# Patient Record
Sex: FEMALE | Race: BLACK OR AFRICAN AMERICAN | NOT HISPANIC OR LATINO | URBAN - METROPOLITAN AREA
[De-identification: names, ages, dates, MRNs, and addresses within clinical notes are randomized per-mention and may not be internally consistent; named-entity substitution may affect disease eponyms.]

---

## 2024-03-02 ENCOUNTER — APPOINTMENT (OUTPATIENT)
Dept: RADIOLOGY | Facility: HOSPITAL | Age: 59
End: 2024-03-02

## 2024-03-02 ENCOUNTER — CLINICAL SUPPORT (OUTPATIENT)
Dept: EMERGENCY MEDICINE | Facility: HOSPITAL | Age: 59
End: 2024-03-02

## 2024-03-02 ENCOUNTER — HOSPITAL ENCOUNTER (EMERGENCY)
Facility: HOSPITAL | Age: 59
Discharge: HOME | End: 2024-03-03
Attending: EMERGENCY MEDICINE

## 2024-03-02 DIAGNOSIS — S22.42XA CLOSED FRACTURE OF MULTIPLE RIBS OF LEFT SIDE, INITIAL ENCOUNTER: Primary | ICD-10-CM

## 2024-03-02 LAB
ANION GAP BLDV CALCULATED.4IONS-SCNC: 9 MMOL/L (ref 10–25)
ATRIAL RATE: 98 BPM
BASE EXCESS BLDV CALC-SCNC: 2.7 MMOL/L (ref -2–3)
BASOPHILS # BLD AUTO: 0.03 X10*3/UL (ref 0–0.1)
BASOPHILS NFR BLD AUTO: 0.3 %
BODY TEMPERATURE: 37 DEGREES CELSIUS
CA-I BLDV-SCNC: 1.12 MMOL/L (ref 1.1–1.33)
CARDIAC TROPONIN I PNL SERPL HS: 16 NG/L (ref 0–34)
CHLORIDE BLDV-SCNC: 104 MMOL/L (ref 98–107)
D DIMER PPP FEU-MCNC: 486 NG/ML FEU
EOSINOPHIL # BLD AUTO: 0.14 X10*3/UL (ref 0–0.7)
EOSINOPHIL NFR BLD AUTO: 1.5 %
ERYTHROCYTE [DISTWIDTH] IN BLOOD BY AUTOMATED COUNT: 11.5 % (ref 11.5–14.5)
GLUCOSE BLD MANUAL STRIP-MCNC: 310 MG/DL (ref 74–99)
GLUCOSE BLDV-MCNC: 353 MG/DL (ref 74–99)
HCO3 BLDV-SCNC: 27.9 MMOL/L (ref 22–26)
HCT VFR BLD AUTO: 37.6 % (ref 36–46)
HCT VFR BLD EST: 41 % (ref 36–46)
HGB BLD-MCNC: 13 G/DL (ref 12–16)
HGB BLDV-MCNC: 13.7 G/DL (ref 12–16)
IMM GRANULOCYTES # BLD AUTO: 0.05 X10*3/UL (ref 0–0.7)
IMM GRANULOCYTES NFR BLD AUTO: 0.5 % (ref 0–0.9)
INHALED O2 CONCENTRATION: 21 %
LACTATE BLDV-SCNC: 0.9 MMOL/L (ref 0.4–2)
LYMPHOCYTES # BLD AUTO: 3.5 X10*3/UL (ref 1.2–4.8)
LYMPHOCYTES NFR BLD AUTO: 36.7 %
MCH RBC QN AUTO: 28 PG (ref 26–34)
MCHC RBC AUTO-ENTMCNC: 34.6 G/DL (ref 32–36)
MCV RBC AUTO: 81 FL (ref 80–100)
MONOCYTES # BLD AUTO: 0.74 X10*3/UL (ref 0.1–1)
MONOCYTES NFR BLD AUTO: 7.8 %
NEUTROPHILS # BLD AUTO: 5.07 X10*3/UL (ref 1.2–7.7)
NEUTROPHILS NFR BLD AUTO: 53.2 %
NRBC BLD-RTO: 0 /100 WBCS (ref 0–0)
OXYHGB MFR BLDV: 54.1 % (ref 45–75)
P AXIS: 55 DEGREES
P OFFSET: 214 MS
P ONSET: 151 MS
PCO2 BLDV: 44 MM HG (ref 41–51)
PH BLDV: 7.41 PH (ref 7.33–7.43)
PLATELET # BLD AUTO: 193 X10*3/UL (ref 150–450)
PO2 BLDV: 37 MM HG (ref 35–45)
POTASSIUM BLDV-SCNC: 3.4 MMOL/L (ref 3.5–5.3)
PR INTERVAL: 142 MS
Q ONSET: 222 MS
QRS COUNT: 16 BEATS
QRS DURATION: 86 MS
QT INTERVAL: 370 MS
QTC CALCULATION(BAZETT): 472 MS
QTC FREDERICIA: 435 MS
R AXIS: -2 DEGREES
RBC # BLD AUTO: 4.64 X10*6/UL (ref 4–5.2)
SAO2 % BLDV: 56 % (ref 45–75)
SODIUM BLDV-SCNC: 137 MMOL/L (ref 136–145)
T AXIS: 88 DEGREES
T OFFSET: 407 MS
VENTRICULAR RATE: 98 BPM
WBC # BLD AUTO: 9.5 X10*3/UL (ref 4.4–11.3)

## 2024-03-02 PROCEDURE — 83880 ASSAY OF NATRIURETIC PEPTIDE: CPT

## 2024-03-02 PROCEDURE — 36415 COLL VENOUS BLD VENIPUNCTURE: CPT | Performed by: EMERGENCY MEDICINE

## 2024-03-02 PROCEDURE — 82947 ASSAY GLUCOSE BLOOD QUANT: CPT

## 2024-03-02 PROCEDURE — 96361 HYDRATE IV INFUSION ADD-ON: CPT

## 2024-03-02 PROCEDURE — 96374 THER/PROPH/DIAG INJ IV PUSH: CPT

## 2024-03-02 PROCEDURE — 99284 EMERGENCY DEPT VISIT MOD MDM: CPT | Mod: 25

## 2024-03-02 PROCEDURE — 84132 ASSAY OF SERUM POTASSIUM: CPT

## 2024-03-02 PROCEDURE — 36415 COLL VENOUS BLD VENIPUNCTURE: CPT

## 2024-03-02 PROCEDURE — 85025 COMPLETE CBC W/AUTO DIFF WBC: CPT

## 2024-03-02 PROCEDURE — 71046 X-RAY EXAM CHEST 2 VIEWS: CPT

## 2024-03-02 PROCEDURE — 2500000004 HC RX 250 GENERAL PHARMACY W/ HCPCS (ALT 636 FOR OP/ED)

## 2024-03-02 PROCEDURE — 85379 FIBRIN DEGRADATION QUANT: CPT

## 2024-03-02 PROCEDURE — 99285 EMERGENCY DEPT VISIT HI MDM: CPT | Performed by: EMERGENCY MEDICINE

## 2024-03-02 PROCEDURE — 93005 ELECTROCARDIOGRAM TRACING: CPT

## 2024-03-02 PROCEDURE — 84484 ASSAY OF TROPONIN QUANT: CPT

## 2024-03-02 PROCEDURE — 72190 X-RAY EXAM OF PELVIS: CPT

## 2024-03-02 RX ORDER — KETOROLAC TROMETHAMINE 15 MG/ML
15 INJECTION, SOLUTION INTRAMUSCULAR; INTRAVENOUS ONCE
Status: COMPLETED | OUTPATIENT
Start: 2024-03-02 | End: 2024-03-02

## 2024-03-02 RX ADMIN — SODIUM CHLORIDE, POTASSIUM CHLORIDE, SODIUM LACTATE AND CALCIUM CHLORIDE 500 ML: 600; 310; 30; 20 INJECTION, SOLUTION INTRAVENOUS at 23:28

## 2024-03-02 RX ADMIN — KETOROLAC TROMETHAMINE 15 MG: 15 INJECTION, SOLUTION INTRAMUSCULAR; INTRAVENOUS at 23:27

## 2024-03-02 ASSESSMENT — COLUMBIA-SUICIDE SEVERITY RATING SCALE - C-SSRS
2. HAVE YOU ACTUALLY HAD ANY THOUGHTS OF KILLING YOURSELF?: NO
1. IN THE PAST MONTH, HAVE YOU WISHED YOU WERE DEAD OR WISHED YOU COULD GO TO SLEEP AND NOT WAKE UP?: NO
6. HAVE YOU EVER DONE ANYTHING, STARTED TO DO ANYTHING, OR PREPARED TO DO ANYTHING TO END YOUR LIFE?: NO

## 2024-03-02 ASSESSMENT — PAIN SCALES - GENERAL: PAINLEVEL_OUTOF10: 7

## 2024-03-02 ASSESSMENT — PAIN - FUNCTIONAL ASSESSMENT: PAIN_FUNCTIONAL_ASSESSMENT: 0-10

## 2024-03-02 ASSESSMENT — LIFESTYLE VARIABLES
HAVE PEOPLE ANNOYED YOU BY CRITICIZING YOUR DRINKING: NO
EVER HAD A DRINK FIRST THING IN THE MORNING TO STEADY YOUR NERVES TO GET RID OF A HANGOVER: NO
EVER FELT BAD OR GUILTY ABOUT YOUR DRINKING: NO
HAVE YOU EVER FELT YOU SHOULD CUT DOWN ON YOUR DRINKING: NO

## 2024-03-03 ENCOUNTER — APPOINTMENT (OUTPATIENT)
Dept: RADIOLOGY | Facility: HOSPITAL | Age: 59
End: 2024-03-03

## 2024-03-03 VITALS
RESPIRATION RATE: 16 BRPM | OXYGEN SATURATION: 100 % | SYSTOLIC BLOOD PRESSURE: 144 MMHG | HEIGHT: 71 IN | TEMPERATURE: 97.4 F | WEIGHT: 195 LBS | HEART RATE: 86 BPM | DIASTOLIC BLOOD PRESSURE: 90 MMHG | BODY MASS INDEX: 27.3 KG/M2

## 2024-03-03 LAB
ALBUMIN SERPL BCP-MCNC: 3.3 G/DL (ref 3.4–5)
ALP SERPL-CCNC: 84 U/L (ref 33–110)
ALT SERPL W P-5'-P-CCNC: 3 U/L (ref 7–45)
ANION GAP SERPL CALC-SCNC: 13 MMOL/L (ref 10–20)
AST SERPL W P-5'-P-CCNC: 6 U/L (ref 9–39)
BILIRUB SERPL-MCNC: 0.6 MG/DL (ref 0–1.2)
BNP SERPL-MCNC: 9 PG/ML (ref 0–99)
BUN SERPL-MCNC: 12 MG/DL (ref 6–23)
CALCIUM SERPL-MCNC: 7.7 MG/DL (ref 8.6–10.6)
CHLORIDE SERPL-SCNC: 108 MMOL/L (ref 98–107)
CO2 SERPL-SCNC: 22 MMOL/L (ref 21–32)
CREAT SERPL-MCNC: 0.53 MG/DL (ref 0.5–1.05)
EGFRCR SERPLBLD CKD-EPI 2021: >90 ML/MIN/1.73M*2
FLUAV RNA RESP QL NAA+PROBE: NOT DETECTED
FLUBV RNA RESP QL NAA+PROBE: NOT DETECTED
GLUCOSE SERPL-MCNC: 328 MG/DL (ref 74–99)
HOLD SPECIMEN: NORMAL
POTASSIUM SERPL-SCNC: 3.3 MMOL/L (ref 3.5–5.3)
PROT SERPL-MCNC: 5 G/DL (ref 6.4–8.2)
SARS-COV-2 RNA RESP QL NAA+PROBE: NOT DETECTED
SODIUM SERPL-SCNC: 140 MMOL/L (ref 136–145)

## 2024-03-03 PROCEDURE — 71260 CT THORAX DX C+: CPT

## 2024-03-03 PROCEDURE — 87636 SARSCOV2 & INF A&B AMP PRB: CPT | Performed by: EMERGENCY MEDICINE

## 2024-03-03 PROCEDURE — 71046 X-RAY EXAM CHEST 2 VIEWS: CPT | Performed by: RADIOLOGY

## 2024-03-03 PROCEDURE — 2550000001 HC RX 255 CONTRASTS: Performed by: EMERGENCY MEDICINE

## 2024-03-03 PROCEDURE — 71260 CT THORAX DX C+: CPT | Performed by: RADIOLOGY

## 2024-03-03 PROCEDURE — 2500000005 HC RX 250 GENERAL PHARMACY W/O HCPCS

## 2024-03-03 PROCEDURE — 72190 X-RAY EXAM OF PELVIS: CPT | Performed by: RADIOLOGY

## 2024-03-03 RX ORDER — METHOCARBAMOL 500 MG/1
500 TABLET, FILM COATED ORAL 2 TIMES DAILY
Qty: 20 TABLET | Refills: 0 | Status: SHIPPED | OUTPATIENT
Start: 2024-03-03 | End: 2024-03-13

## 2024-03-03 RX ORDER — LIDOCAINE 50 MG/G
1 PATCH TOPICAL DAILY
Qty: 10 PATCH | Refills: 0 | Status: SHIPPED | OUTPATIENT
Start: 2024-03-03 | End: 2024-03-08

## 2024-03-03 RX ORDER — LIDOCAINE 560 MG/1
1 PATCH PERCUTANEOUS; TOPICAL; TRANSDERMAL DAILY
Status: DISCONTINUED | OUTPATIENT
Start: 2024-03-03 | End: 2024-03-03 | Stop reason: HOSPADM

## 2024-03-03 RX ORDER — IBUPROFEN 600 MG/1
600 TABLET ORAL EVERY 8 HOURS PRN
Qty: 30 TABLET | Refills: 0 | Status: SHIPPED | OUTPATIENT
Start: 2024-03-03

## 2024-03-03 RX ORDER — ACETAMINOPHEN 325 MG/1
650 TABLET ORAL EVERY 6 HOURS PRN
Qty: 30 TABLET | Refills: 0 | Status: SHIPPED | OUTPATIENT
Start: 2024-03-03

## 2024-03-03 RX ADMIN — IOHEXOL 75 ML: 350 INJECTION, SOLUTION INTRAVENOUS at 01:50

## 2024-03-03 RX ADMIN — LIDOCAINE 1 PATCH: 4 PATCH TOPICAL at 03:05

## 2024-03-03 NOTE — ED PROVIDER NOTES
I performed a history and physical examination of Shanelle Nunez and discussed her management with Dr. Barrow.  I agree with the history, physical, assessment, and plan of care, with the following exceptions: None    I was present for the following procedures: None  Time Spent in Critical Care of the patient: None  Time spent in discussions with the patient and family: 15        Jose F Guillen MD MPH       Jose F Guillen MD MPH  03/09/24 0125

## 2024-03-03 NOTE — DISCHARGE INSTRUCTIONS
You were seen here in our emergency department after concern of having chest pain.  Your CT results show a left fifth rib fracture and sixth rib fracture consistent with where you are having pain, likely from when you fell on the ice.  Rib fractures can be very painful and as such I have provided you with a prescription for lidocaine patches, Tylenol and ibuprofen, I have also given you a muscle relaxant to help with spasm.  Please take these medications together for good pain control.  I have also discharged you home with an incentive spirometer.  Please use this every 1-2 hours daily for the next 3 days to ensure that your lungs stay open as deep breaths can sometimes be painful in the setting of a rib fracture.

## 2024-03-03 NOTE — ED PROVIDER NOTES
HPI   Chief Complaint   Patient presents with    Chest Pain       Patient is a 58-year-old female with history of type 2 diabetes presenting to the emergency department with chest pain.  Patient states 3 days ago she was walking to work when she slipped on ice and landed on her left side, stating that she hit her left hip and chest.  Denies hitting her head, no loss of consciousness.  States she was ambulatory and has been able to walk since the accident.  Has had persistent chest pain that is worse with deep breaths and a cough.  Denies affiliated headache, vision changes, rhinorrhea, chills, congestion, no affiliated shortness of breath or difficulty breathing.  No nausea, vomiting, diarrhea, abdominal pain.  Is endorsing left hip pain but no knee pain or lower extremity numbness or weakness.  No urinary symptoms including blood in the urine or stool.  States she is otherwise been well and has no other acute complaints                          Corrie Coma Scale Score: 15                     Patient History   Past Medical History:   Diagnosis Date    Diabetes (CMS/[facility])      History reviewed. No pertinent surgical history.  No family history on file.  Social History     Tobacco Use    Smoking status: Every Day     Types: Cigarettes    Smokeless tobacco: Never   Vaping Use    Vaping Use: Never used   Substance Use Topics    Alcohol use: Never    Drug use: Never       Physical Exam   ED Triage Vitals   Temperature Heart Rate Respirations BP   03/02/24 2233 03/02/24 2233 03/02/24 2233 03/02/24 2233   36.5 °C (97.7 °F) (!) 102 18 (!) 166/98      Pulse Ox Temp Source Heart Rate Source Patient Position   03/02/24 2233 03/02/24 2233 -- 03/03/24 0022   98 % Temporal  Lying      BP Location FiO2 (%)     03/03/24 0022 --     Left arm        Physical Exam  Vitals and nursing note reviewed.   Constitutional:       General: She is not in acute distress.     Appearance: Normal appearance. She is not toxic-appearing.   HENT:       Head: Normocephalic.      Mouth/Throat:      Mouth: Mucous membranes are moist.   Eyes:      Conjunctiva/sclera: Conjunctivae normal.      Pupils: Pupils are equal, round, and reactive to light.   Cardiovascular:      Rate and Rhythm: Normal rate and regular rhythm.      Pulses:           Radial pulses are 2+ on the right side and 2+ on the left side.      Comments: Left chest wall tenderness  Pulmonary:      Effort: Pulmonary effort is normal. No respiratory distress.      Breath sounds: Normal breath sounds.   Abdominal:      General: Abdomen is flat.      Palpations: Abdomen is soft.      Tenderness: There is no abdominal tenderness. There is no guarding or rebound.   Musculoskeletal:      Cervical back: Normal range of motion and neck supple.      Right lower leg: No edema.      Left lower leg: No edema.      Comments: Left hip tenderness to palpation, no pain with passive or active range of motion   Skin:     General: Skin is warm.   Neurological:      Mental Status: She is alert and oriented to person, place, and time.   Psychiatric:         Mood and Affect: Mood normal.         ED Course & [facility]   ED Course as of 03/03/24 0401   Sun Mar 03, 2024   0359 EKG performed at 2246.  Sinus rhythm with occasional PVC complex.  Rate 98, no T wave abnormalities, no ST elevation or depression, MA, QRS and QT interval within normal limits.  No previous EKG for comparison. [PW]      ED Course User Index  [PW] Hansa Barrow DO         Diagnoses as of 03/03/24 0401   Closed fracture of multiple ribs of left side, initial encounter       Medical Decision Making  Patient is a 58-year-old female presenting to the emergency department with chest pain and left hip pain.  On physical exam, patient initially is hypertensive and tachycardic, otherwise hemodynamically stable with no oxygen requirements.  Patient overall looks very well-appearing but does have reproducible tenderness over the left chest wall.  Given her mechanism of  injury I have concerns for possible injury to her ribs in addition to her hip.  Chest x-ray did not show any signs of trauma or displaced fractures, no consolidation to suggest an infection or effusion.  Left hip x-ray is within normal limits no evidence of injury.  Her presentation is not consistent with an acute PE as her tachycardia and hypertension resolved with pain medication and fluids.  She has no previous history however D-dimer was ordered and within normal limits.  Is endorsing a cough so viral swabs sent, low concern as well for pneumothorax as this is not visualized on chest x-ray, no thoracic dissection, pericarditis, tamponade or pneumonia, no myocarditis as her troponin is within normal limits.  Has a heart score of 2.  As she is still quite tender I have concerns that there might be a possible pneumonia we were not able to visualize on x-ray versus rib fracture so CT chest ordered confirming an acute nondisplaced fracture of the fifth rib and a mildly displaced fracture of the sixth rib.  Patient updated with these results, given a lidocaine patch in addition to the medication she received earlier also provided the patient with an incentive spirometer.  Patient will be discharged home with ibuprofen, Tylenol, Robaxin and lidocaine patches.  Do not feel that further workup indicated at this time. Discussed results, diagnosis/differential, and plan with patient. Patient advised to follow up with primary physician in 2-3 days. Discussed return precautions and encouraged patient to return to the Emergency Department for any concerning symptoms or worsening condition. Patient expresses understanding and is in agreement. All questions answered. Patient discharged in stable condition.        Procedure  Procedures     Hansa Barrow DO  Resident  03/03/24 7455

## 2024-03-03 NOTE — ED TRIAGE NOTES
"Patient presents via EMS with complaint of fall. Patient states she fell on the ice last Wednesday on the left side. Pt was seen at [facility] where she works and was given medication that is \"making her worse.\" Pt states she is not able to keep anything down, left sided pain that radiates around, and unable to sleep because then she has trouble breathing. Patient was given ibuprofen, tramadol and prednisone scripts without relief. Pt takes metformin for her diabetes that has been elevated.   "

## 2025-01-12 ENCOUNTER — HOSPITAL ENCOUNTER (EMERGENCY)
Facility: HOSPITAL | Age: 60
Discharge: HOME | End: 2025-01-12
Attending: STUDENT IN AN ORGANIZED HEALTH CARE EDUCATION/TRAINING PROGRAM
Payer: MEDICAID

## 2025-01-12 VITALS
OXYGEN SATURATION: 100 % | TEMPERATURE: 97.9 F | HEIGHT: 71 IN | HEART RATE: 81 BPM | SYSTOLIC BLOOD PRESSURE: 138 MMHG | RESPIRATION RATE: 16 BRPM | DIASTOLIC BLOOD PRESSURE: 87 MMHG | BODY MASS INDEX: 26.6 KG/M2 | WEIGHT: 190 LBS

## 2025-01-12 DIAGNOSIS — E86.0 DEHYDRATION: ICD-10-CM

## 2025-01-12 DIAGNOSIS — R55 NEAR SYNCOPE: Primary | ICD-10-CM

## 2025-01-12 LAB
ALBUMIN SERPL BCP-MCNC: 4.4 G/DL (ref 3.4–5)
ALP SERPL-CCNC: 118 U/L (ref 33–110)
ALT SERPL W P-5'-P-CCNC: 8 U/L (ref 7–45)
ANION GAP SERPL CALC-SCNC: 14 MMOL/L (ref 10–20)
APPEARANCE UR: CLEAR
AST SERPL W P-5'-P-CCNC: 11 U/L (ref 9–39)
BACTERIA #/AREA URNS AUTO: ABNORMAL /HPF
BASOPHILS # BLD AUTO: 0.07 X10*3/UL (ref 0–0.1)
BASOPHILS NFR BLD AUTO: 0.7 %
BILIRUB SERPL-MCNC: 0.5 MG/DL (ref 0–1.2)
BILIRUB UR STRIP.AUTO-MCNC: NEGATIVE MG/DL
BUN SERPL-MCNC: 12 MG/DL (ref 6–23)
CALCIUM SERPL-MCNC: 9.3 MG/DL (ref 8.6–10.6)
CHLORIDE SERPL-SCNC: 106 MMOL/L (ref 98–107)
CO2 SERPL-SCNC: 23 MMOL/L (ref 21–32)
COLOR UR: ABNORMAL
CREAT SERPL-MCNC: 0.74 MG/DL (ref 0.5–1.05)
EGFRCR SERPLBLD CKD-EPI 2021: >90 ML/MIN/1.73M*2
EOSINOPHIL # BLD AUTO: 0.26 X10*3/UL (ref 0–0.7)
EOSINOPHIL NFR BLD AUTO: 2.6 %
ERYTHROCYTE [DISTWIDTH] IN BLOOD BY AUTOMATED COUNT: 12.1 % (ref 11.5–14.5)
GLUCOSE BLD MANUAL STRIP-MCNC: 125 MG/DL (ref 74–99)
GLUCOSE SERPL-MCNC: 120 MG/DL (ref 74–99)
GLUCOSE UR STRIP.AUTO-MCNC: ABNORMAL MG/DL
HCT VFR BLD AUTO: 41 % (ref 36–46)
HGB BLD-MCNC: 14.4 G/DL (ref 12–16)
IMM GRANULOCYTES # BLD AUTO: 0.04 X10*3/UL (ref 0–0.7)
IMM GRANULOCYTES NFR BLD AUTO: 0.4 % (ref 0–0.9)
KETONES UR STRIP.AUTO-MCNC: NEGATIVE MG/DL
LEUKOCYTE ESTERASE UR QL STRIP.AUTO: NEGATIVE
LYMPHOCYTES # BLD AUTO: 3.93 X10*3/UL (ref 1.2–4.8)
LYMPHOCYTES NFR BLD AUTO: 39.6 %
MCH RBC QN AUTO: 29 PG (ref 26–34)
MCHC RBC AUTO-ENTMCNC: 35.1 G/DL (ref 32–36)
MCV RBC AUTO: 83 FL (ref 80–100)
MONOCYTES # BLD AUTO: 0.7 X10*3/UL (ref 0.1–1)
MONOCYTES NFR BLD AUTO: 7.1 %
MUCOUS THREADS #/AREA URNS AUTO: ABNORMAL /LPF
NEUTROPHILS # BLD AUTO: 4.92 X10*3/UL (ref 1.2–7.7)
NEUTROPHILS NFR BLD AUTO: 49.6 %
NITRITE UR QL STRIP.AUTO: ABNORMAL
NRBC BLD-RTO: 0 /100 WBCS (ref 0–0)
PH UR STRIP.AUTO: 6.5 [PH]
PLATELET # BLD AUTO: 288 X10*3/UL (ref 150–450)
POTASSIUM SERPL-SCNC: 4 MMOL/L (ref 3.5–5.3)
PROT SERPL-MCNC: 6.9 G/DL (ref 6.4–8.2)
PROT UR STRIP.AUTO-MCNC: NEGATIVE MG/DL
RBC # BLD AUTO: 4.96 X10*6/UL (ref 4–5.2)
RBC # UR STRIP.AUTO: NEGATIVE /UL
RBC #/AREA URNS AUTO: ABNORMAL /HPF
SODIUM SERPL-SCNC: 139 MMOL/L (ref 136–145)
SP GR UR STRIP.AUTO: 1.02
UROBILINOGEN UR STRIP.AUTO-MCNC: NORMAL MG/DL
WBC # BLD AUTO: 9.9 X10*3/UL (ref 4.4–11.3)
WBC #/AREA URNS AUTO: ABNORMAL /HPF

## 2025-01-12 PROCEDURE — 82947 ASSAY GLUCOSE BLOOD QUANT: CPT

## 2025-01-12 PROCEDURE — 99284 EMERGENCY DEPT VISIT MOD MDM: CPT | Mod: 25 | Performed by: STUDENT IN AN ORGANIZED HEALTH CARE EDUCATION/TRAINING PROGRAM

## 2025-01-12 PROCEDURE — 99284 EMERGENCY DEPT VISIT MOD MDM: CPT | Performed by: STUDENT IN AN ORGANIZED HEALTH CARE EDUCATION/TRAINING PROGRAM

## 2025-01-12 PROCEDURE — 81001 URINALYSIS AUTO W/SCOPE: CPT

## 2025-01-12 PROCEDURE — 93010 ELECTROCARDIOGRAM REPORT: CPT | Performed by: STUDENT IN AN ORGANIZED HEALTH CARE EDUCATION/TRAINING PROGRAM

## 2025-01-12 PROCEDURE — 36415 COLL VENOUS BLD VENIPUNCTURE: CPT

## 2025-01-12 PROCEDURE — 2500000004 HC RX 250 GENERAL PHARMACY W/ HCPCS (ALT 636 FOR OP/ED): Mod: SE

## 2025-01-12 PROCEDURE — 96360 HYDRATION IV INFUSION INIT: CPT

## 2025-01-12 PROCEDURE — 87086 URINE CULTURE/COLONY COUNT: CPT

## 2025-01-12 PROCEDURE — 85025 COMPLETE CBC W/AUTO DIFF WBC: CPT

## 2025-01-12 PROCEDURE — 80053 COMPREHEN METABOLIC PANEL: CPT

## 2025-01-12 RX ADMIN — SODIUM CHLORIDE, POTASSIUM CHLORIDE, SODIUM LACTATE AND CALCIUM CHLORIDE 1000 ML: 600; 310; 30; 20 INJECTION, SOLUTION INTRAVENOUS at 13:20

## 2025-01-12 ASSESSMENT — LIFESTYLE VARIABLES
HAVE PEOPLE ANNOYED YOU BY CRITICIZING YOUR DRINKING: NO
EVER HAD A DRINK FIRST THING IN THE MORNING TO STEADY YOUR NERVES TO GET RID OF A HANGOVER: NO
HAVE YOU EVER FELT YOU SHOULD CUT DOWN ON YOUR DRINKING: NO
TOTAL SCORE: 0
EVER FELT BAD OR GUILTY ABOUT YOUR DRINKING: NO

## 2025-01-12 ASSESSMENT — COLUMBIA-SUICIDE SEVERITY RATING SCALE - C-SSRS
2. HAVE YOU ACTUALLY HAD ANY THOUGHTS OF KILLING YOURSELF?: NO
6. HAVE YOU EVER DONE ANYTHING, STARTED TO DO ANYTHING, OR PREPARED TO DO ANYTHING TO END YOUR LIFE?: NO
1. IN THE PAST MONTH, HAVE YOU WISHED YOU WERE DEAD OR WISHED YOU COULD GO TO SLEEP AND NOT WAKE UP?: NO

## 2025-01-12 ASSESSMENT — PAIN - FUNCTIONAL ASSESSMENT: PAIN_FUNCTIONAL_ASSESSMENT: 0-10

## 2025-01-12 ASSESSMENT — PAIN SCALES - GENERAL: PAINLEVEL_OUTOF10: 0 - NO PAIN

## 2025-01-12 NOTE — ED PROVIDER NOTES
Emergency Department Provider Note        History of Present Illness     History provided by: Patient  Limitations to History: None  External Records Reviewed with Brief Summary: I reviewed prior ED visits, Care Everywhere, discharge summaries and outpatient records as appropriate.       HPI:  Shanelle Nunez is a 59 y.o. female with history of type 2 diabetes presenting to the emergency department with complaint of dizziness.     Physical Exam   Triage vitals:  T 36.6 °C (97.9 °F)    /90  RR 16  O2 99 %      General: Awake, alert, in no acute distress  Eyes: Gaze conjugate.  No scleral icterus or injection  HENT: Normo-cephalic, atraumatic. No stridor  CV: Regular rate, regular rhythm. Radial pulses 2+ bilaterally  Resp: Breathing non-labored, speaking in full sentences.  Clear to auscultation bilaterally  GI: Soft, non-distended, non-tender. No rebound or guarding.  MSK/Extremities: No gross bony deformities. Moving all extremities  Skin: Warm. Appropriate color  Neuro: Alert. Oriented. Face symmetric. Speech is fluent.  Gross strength and sensation intact in b/l UE and LEs  Psych: Appropriate mood and affect    Medical Decision Making & ED Course   Medical Decision Makin y.o. female presenting to the emergency department with concern for dizziness.  Has no recent history of infection, no affiliated neurofocal deficits including vision loss or vision changes, dysarthria, new weakness or numbness, nystagmus, has a reassuring physical exam and is ambulatory without significant difficulty.  No recent falls or history of trauma.  Does not appear acutely infectious but will obtain labs, give patient IV fluids and reassess.  On review of labs, she has no evidence of infection, CBC shows stable leukocytosis, electrolytes are within normal limits, urinalysis is negative for evidence of infection, reports 2+ nitrites but she has no white cells and no symptoms of dysuria, hematuria or suprapubic  discomfort.  On reevaluation after completion of IV fluids, patient reports improvement of symptoms and is requesting to be discharged.  Given she is otherwise hemodynamically stable and asymptomatic on exam, her EKG is reassuring she has stable vitals, she is neurovascularly intact, I feel this is reasonable.  Encourage patient to follow-up with her primary care and was discharged in stable condition.  ----     Social Determinants of Health which Significantly Impact Care: None identified     EKG Independent Interpretation: EKG interpreted by myself. Please see ED Course for full interpretation.    Independent Result Review and Interpretation: Relevant laboratory and radiographic results were reviewed and independently interpreted by myself.  As necessary, they are commented on in the ED Course.    Chronic conditions affecting the patient's care: None    The patient was discussed with the following consultants/services: None    Care Considerations: None    ED Course:  ED Course as of 01/14/25 1039   Sun Jan 12, 2025   1311 EKG performed at 1305, normal sinus rhythm with rate of 81.  Normal axis, QTc prolonged at 506.  No ST elevation or depression, no T wave abnormalities.  No evidence of ischemia [PW]      ED Course User Index  [PW] Hansa Barrow DO         Diagnoses as of 01/14/25 1039   Near syncope   Dehydration     Disposition   As a result of the work-up, the patient was discharged home.  she was informed of her diagnosis and instructed to come back with any concerns or worsening of condition.  she and was agreeable to the plan as discussed above.  she was given the opportunity to ask questions.  All of the patient's questions were answered.    Procedures   Procedures    Patient seen and discussed with ED attending physician.    Hansa Barrow DO  Emergency Medicine       Hansa Barrow DO  Resident  01/14/25 1039

## 2025-01-13 LAB — HOLD SPECIMEN: NORMAL

## 2025-01-14 LAB — BACTERIA UR CULT: ABNORMAL

## 2025-01-15 ENCOUNTER — TELEPHONE (OUTPATIENT)
Dept: PHARMACY | Facility: HOSPITAL | Age: 60
End: 2025-01-15
Payer: MEDICAID

## 2025-01-15 NOTE — PROGRESS NOTES
EDPD Note: Rapid Result Review    I reviewed Shanelle Nunze 's chart regarding a positive urine culture/result that was taken during their recent emergency room visit. The patient was not told about these results prior to leaving the emergency department. Therefore, patient was contacted and given appropriate education.     Patient presented to the ED 1/12 with symptoms of dizziness. No urinary symptoms. No findings of systemic signs of infection. Patient was discharged without antibiotics due to lack of symptoms. Culture resulted positive for E coli. At the time of this call, patient is still not experiencing urinary symptoms. Asymptomatic bacteriuria does not require treatment. No questions at this time.    Susceptibility data from last 90 days.  Collected Specimen Info Organism Amoxicillin/Clavulanate Ampicillin Ampicillin/Sulbactam Cefazolin Cefazolin (uncomplicated UTIs only) Ceftriaxone Ciprofloxacin Gentamicin Nitrofurantoin Piperacillin/Tazobactam   01/12/25 Urine from Clean Catch/Voided Escherichia coli  I  R  R  R  S  S  S  S  S  S     Collected Specimen Info Organism Trimethoprim/Sulfamethoxazole   01/12/25 Urine from Clean Catch/Voided Escherichia coli  S     Admission on 01/12/2025, Discharged on 01/12/2025   Component Date Value Ref Range Status    POCT Glucose 01/12/2025 125 (H)  74 - 99 mg/dL Final    WBC 01/12/2025 9.9  4.4 - 11.3 x10*3/uL Final    nRBC 01/12/2025 0.0  0.0 - 0.0 /100 WBCs Final    RBC 01/12/2025 4.96  4.00 - 5.20 x10*6/uL Final    Hemoglobin 01/12/2025 14.4  12.0 - 16.0 g/dL Final    Hematocrit 01/12/2025 41.0  36.0 - 46.0 % Final    MCV 01/12/2025 83  80 - 100 fL Final    MCH 01/12/2025 29.0  26.0 - 34.0 pg Final    MCHC 01/12/2025 35.1  32.0 - 36.0 g/dL Final    RDW 01/12/2025 12.1  11.5 - 14.5 % Final    Platelets 01/12/2025 288  150 - 450 x10*3/uL Final    Neutrophils % 01/12/2025 49.6  40.0 - 80.0 % Final    Immature Granulocytes %, Automated 01/12/2025 0.4  0.0 - 0.9 % Final     Immature Granulocyte Count (IG) includes promyelocytes, myelocytes and metamyelocytes but does not include bands. Percent differential counts (%) should be interpreted in the context of the absolute cell counts (cells/UL).    Lymphocytes % 01/12/2025 39.6  13.0 - 44.0 % Final    Monocytes % 01/12/2025 7.1  2.0 - 10.0 % Final    Eosinophils % 01/12/2025 2.6  0.0 - 6.0 % Final    Basophils % 01/12/2025 0.7  0.0 - 2.0 % Final    Neutrophils Absolute 01/12/2025 4.92  1.20 - 7.70 x10*3/uL Final    Percent differential counts (%) should be interpreted in the context of the absolute cell counts (cells/uL).    Immature Granulocytes Absolute, Au* 01/12/2025 0.04  0.00 - 0.70 x10*3/uL Final    Lymphocytes Absolute 01/12/2025 3.93  1.20 - 4.80 x10*3/uL Final    Monocytes Absolute 01/12/2025 0.70  0.10 - 1.00 x10*3/uL Final    Eosinophils Absolute 01/12/2025 0.26  0.00 - 0.70 x10*3/uL Final    Basophils Absolute 01/12/2025 0.07  0.00 - 0.10 x10*3/uL Final    Glucose 01/12/2025 120 (H)  74 - 99 mg/dL Final    Sodium 01/12/2025 139  136 - 145 mmol/L Final    Potassium 01/12/2025 4.0  3.5 - 5.3 mmol/L Final    Chloride 01/12/2025 106  98 - 107 mmol/L Final    Bicarbonate 01/12/2025 23  21 - 32 mmol/L Final    Anion Gap 01/12/2025 14  10 - 20 mmol/L Final    Urea Nitrogen 01/12/2025 12  6 - 23 mg/dL Final    Creatinine 01/12/2025 0.74  0.50 - 1.05 mg/dL Final    eGFR 01/12/2025 >90  >60 mL/min/1.73m*2 Final    Calculations of estimated GFR are performed using the 2021 CKD-EPI Study Refit equation without the race variable for the IDMS-Traceable creatinine methods.  https://jasn.asnjournals.org/content/early/2021/09/22/ASN.6501567133    Calcium 01/12/2025 9.3  8.6 - 10.6 mg/dL Final    Albumin 01/12/2025 4.4  3.4 - 5.0 g/dL Final    Alkaline Phosphatase 01/12/2025 118 (H)  33 - 110 U/L Final    Total Protein 01/12/2025 6.9  6.4 - 8.2 g/dL Final    AST 01/12/2025 11  9 - 39 U/L Final    Bilirubin, Total 01/12/2025 0.5  0.0 - 1.2  mg/dL Final    ALT 01/12/2025 8  7 - 45 U/L Final    Patients treated with Sulfasalazine may generate falsely decreased results for ALT.    Color, Urine 01/12/2025 Light-Yellow  Light-Yellow, Yellow, Dark-Yellow Final    Appearance, Urine 01/12/2025 Clear  Clear Final    Specific Gravity, Urine 01/12/2025 1.019  1.005 - 1.035 Final    pH, Urine 01/12/2025 6.5  5.0, 5.5, 6.0, 6.5, 7.0, 7.5, 8.0 Final    Protein, Urine 01/12/2025 NEGATIVE  NEGATIVE, 10 (TRACE), 20 (TRACE) mg/dL Final    Glucose, Urine 01/12/2025 OVER (4+) (A)  Normal mg/dL Final    Blood, Urine 01/12/2025 NEGATIVE  NEGATIVE Final    Ketones, Urine 01/12/2025 NEGATIVE  NEGATIVE mg/dL Final    Bilirubin, Urine 01/12/2025 NEGATIVE  NEGATIVE Final    Urobilinogen, Urine 01/12/2025 Normal  Normal mg/dL Final    Nitrite, Urine 01/12/2025 2+ (A)  NEGATIVE Final    Leukocyte Esterase, Urine 01/12/2025 NEGATIVE  NEGATIVE Final    Extra Tube 01/12/2025 Hold for add-ons.   Final    Auto resulted.    WBC, Urine 01/12/2025 1-5  1-5, NONE /HPF Final    RBC, Urine 01/12/2025 1-2  NONE, 1-2, 3-5 /HPF Final    Bacteria, Urine 01/12/2025 1+ (A)  NONE SEEN /HPF Final    Mucus, Urine 01/12/2025 FEW  Reference range not established. /LPF Final    Urine Culture 01/12/2025 >=100,000 CFU/mL Escherichia coli (A)   Final       No further follow up needed from EDPD Team.     If there are any other questions for the ED Post-Discharge Culture Follow Up Team, please contact [phone]. Fax: [phone].    Niall Ceja, PorscheD